# Patient Record
Sex: FEMALE
[De-identification: names, ages, dates, MRNs, and addresses within clinical notes are randomized per-mention and may not be internally consistent; named-entity substitution may affect disease eponyms.]

---

## 2017-08-21 NOTE — C.PDOC
History Of Present Illness


61 y/o female presents to ED with complaints of new onset front of left knee 

pain and swelling for 10 days. Patient states pain is worse with weight bearing 

and movement stating,"I feel crunching". Patient reports limited relief with 

Motrin but did not take any today secondary to limited use for GI upset. 

Patient denies trauma, weakness, numbness or any other complaints at this time. 





























NEW ONSET L KNEE PAIN X 10 DAYS. NO TRAUMA. PAIN FRONT OF KNEE WORSE W WT BEAR, 

MOVEMENT. "I FEEL CRUNCHING" LIMITED RELIEF W MOTRIN. NONE TAKEN TODAY. +SWELL. 

PS LIMITED USE MOTRIN DUE TO GI UPSET





EXAM


MILD DIST NONTOX


EXT L KNEE LMITED DUE TO BODY HABITUS. +GEN TEND ANTERIOR. AROM WO DIFF, 

REPRODUC PAIN. NO DEFORM, SUBLUX.


Time Seen by Provider: 08/21/17 14:48


Chief Complaint (Nursing): Lower Extremity Problem/Injury


History Per: Patient


History/Exam Limitations: no limitations


Onset/Duration Of Symptoms: Days


Current Symptoms Are (Timing): Still Present





Past Medical History


Reviewed: Historical Data, Nursing Documentation, Vital Signs


Vital Signs: 


 Last Vital Signs











Temp  98.8 F   08/21/17 14:36


 


Pulse  76   08/21/17 14:36


 


Resp  16   08/21/17 14:36


 


BP  152/80 H  08/21/17 14:36


 


Pulse Ox  99   08/21/17 15:00














- Medical History


PMH: Gastritis, HTN, Osteoporosis


Family History: States: No Known Family Hx





- Social History


Hx Tobacco Use: No


Hx Alcohol Use: No


Hx Substance Use: No





- Immunization History


Hx Tetanus Toxoid Vaccination: No


Hx Influenza Vaccination: No


Hx Pneumococcal Vaccination: No





Review Of Systems


Except As Marked, All Systems Reviewed And Found Negative.


Musculoskeletal: Positive for: Leg Pain.  Negative for: Foot Pain


Skin: Negative for: Rash


Neurological: Negative for: Weakness, Numbness





Physical Exam





- Physical Exam


Appears: Non-toxic, Other (Mild distress)


Skin: Normal Color, Warm, Dry, No Rash


Eye(s): bilateral: Normal Inspection


Oral Mucosa: Moist


Extremity: Tenderness (General tenderness to anterior left knee), Capillary 

Refill (<2 seconds), No Deformity, Other (Left knee limited due to body 

habitus. Reproducible pain, No sublux)


Extremity: Bilateral: Normal ROM


Neurological/Psych: Oriented x3, Normal Speech, Normal Cognition, Normal Motor, 

Normal Sensation





ED Course And Treatment


O2 Sat by Pulse Oximetry: 99 (RA)


Pulse Ox Interpretation: Normal





Disposition


Counseled Patient/Family Regarding: Diagnosis, Need For Followup, Rx Given





- Disposition


Referrals: 


[facility] [Outside]


[facility] [Outside]


Michael Valverde III, MD [Staff Provider] - 


Disposition: HOME/ ROUTINE


Disposition Time: 14:59


Condition: IMPROVED


Prescriptions: 


Acetaminophen/Codeine [Tylenol/Codeine 300 MG/30 MG] 2 tab PO Q6H #20 tab


Famotidine [Pepcid AC] 10 mg PO QN #30 tablet


Ibuprofen [Motrin] 400 mg PO QID #30 tab


Instructions:  Osteoarthritis (ED)


Forms:  [employer] (English), Work Excuse


Print Language: [place]iboutian





- Clinical Impression


Clinical Impression: 


 Knee pain








- PA / NP / Resident Statement


MD/DO has examined the patient and agrees with the treatment plan.





- Scribe Statement


The provider has reviewed the documentation as recorded by the Tiffany Cabral





All medical record entries made by the Syedaibphilomena were at my direction and 

personally dictated by me. I have reviewed the chart and agree that the record 

accurately reflects my personal performance of the history, physical exam, 

medical decision making, and the department course for this patient. I have 

also personally directed, reviewed, and agree with the discharge instructions 

and disposition.

## 2019-02-08 NOTE — C.PDOC
History Of Present Illness


64-year-old female presents to the ED for evaluation of right posterior knee 

pain for three days. Patient denies fever, chills, calf tenderness, rash, or any

falls/injuries at this time.


Time Seen by Provider: 02/08/19 15:49


Chief Complaint (Nursing): Lower Extremity Problem/Injury


History Per: Patient


History/Exam Limitations: no limitations


Onset/Duration Of Symptoms: Days (3)


Current Symptoms Are (Timing): Still Present


Additional History Per: Patient





- Knee


Description Of Injury: denies: Fell, Struck With Object, Struck Against Object, 

Twisted





Past Medical History


Reviewed: Historical Data, Nursing Documentation, Vital Signs


Vital Signs: 





                                Last Vital Signs











Temp  98.4 F   02/08/19 15:46


 


Pulse  68   02/08/19 15:46


 


Resp  16   02/08/19 15:46


 


BP  143/85   02/08/19 15:46


 


Pulse Ox  96   02/08/19 15:46














- Medical History


PMH: Gastritis, HTN, Osteoporosis


Surgical History: No Surg Hx


Family History: States: Unknown Family Hx





- Social History


Hx Tobacco Use: No


Hx Alcohol Use: No


Hx Substance Use: No





- Immunization History


Hx Tetanus Toxoid Vaccination: No


Hx Influenza Vaccination: No


Hx Pneumococcal Vaccination: No





Review Of Systems


Constitutional: Negative for: Fever, Chills


Musculoskeletal: Positive for: Other (right posterior knee pain, no calf pain )





Physical Exam





- Physical Exam


Appears: Non-toxic, No Acute Distress


Skin: Normal Color, Warm, Dry, No Ecchymosis


Head: Atraumatic, Normacephalic


Eye(s): bilateral: Normal Inspection


Extremity: Normal ROM, Tenderness (mild, to palpation of right lateral knee ), 

No Calf Tenderness, Capillary Refill (less than 2 seconds ), No Deformity, No 

Swelling, No Other (erythema or warmth to right knee )


Pulses: Left Dorsalis Pedis: Normal, Right Dorsalis Pedis: Normal


Neurological/Psych: Oriented x3, Normal Speech, Normal Cognition, Normal Motor, 

Normal Sensation


Gait: Steady





ED Course And Treatment


O2 Sat by Pulse Oximetry: 96 (on RA)


Pulse Ox Interpretation: Normal





- Other Rad


  ** knee XR 


X-Ray: Viewed By Me, Read By Radiologist


Interpretation: Date of service:  02/08/2019.  PROCEDURE:  Right Knee 

Radiographs.  HISTORY:  RIGHT KNEE PAIN.  COMPARISON:  01/08/2016.  FINDINGS:  

BONES:  Bone alignment and mineralization are normal.  There is no acute 

displaced fracture or bone destruction.  JOINTS:  There is mild tricompartmental

degenerative osteoarthrosis with reduced joint spaces, marginal osteophytes and 

tibial spiking, worse in the medial compartment.  JOINT EFFUSION:  None.  OTHER 

FINDINGS:  There is multifocal calcification in the lateral collateral ligament,

which may be post-traumatic in etiology.  IMPRESSION:  No acute fracture or 

dislocation.  Mild tricompartmental degenerative osteoarthrosis, worse in the 

medial compartment.


Progress Note: Right knee x-ray was ordered and reviewed. X-ray shows some 

arthritic changes. Ace wrap was applied to the knee by me.  Naproxen PO given.  

On reassessment, patient is resting comfortably, showing no signs of distress 

and is stable for discharge. Patient is advised to follow up with orthopedic 

care within 1 week for further evaluation.





Disposition


Counseled Patient/Family Regarding: Studies Performed, Diagnosis, Need For 

Followup, Rx Given





- Disposition


Referrals: 


Xander Avelar MD [Staff Provider] - 


Disposition: HOME/ ROUTINE


Disposition Time: 16:50


Condition: STABLE


Additional Instructions: 


FOLLOW UP WITH ORTHOPEDICS WITHIN 1 WEEK





USE PAIN MEDICATION AS NEEDED





RETURN TO EMERGENCY ROOM IF SYMPTOMS BECOME WORSE 








SEGUIR CON ORTOPEDIA EN JOSE LUIS SEMANA





UTILICE MEDICAMENTOS PARA EL DOLOR SEGN LO NECESARIO





VUELVA A LA LADI DE EMERGENCIA SI LOS SNTOMAS SE HACEN PEOR


Prescriptions: 


Naproxen 375 mg PO BID PRN #20 tablet


 PRN Reason: pain


Instructions:  Osteoarthritis (DC), Knee Pain (DC)


Forms:  [employer] (English)


Print Language: Greenlandic





- Clinical Impression


Clinical Impression: 


 Osteoarthritis of right knee, Right knee pain








- Scribe Statement


The provider has reviewed the documentation as recorded by the Scribe (Debbie Amezquita)


Provider Attestation: 








All medical record entries made by the Scribe were at my direction and perso

malcolm dictated by me. I have reviewed the chart and agree that the record 

accurately reflects my personal performance of the history, physical exam, 

medical decision making, and the department course for this patient. I have also

personally directed, reviewed, and agree with the discharge instructions and 

disposition.

## 2019-02-08 NOTE — RAD
Date of service: 



02/08/2019



PROCEDURE:  Right Knee Radiographs.



HISTORY:

RIGHT KNEE PAIN



COMPARISON:

01/08/2016.



FINDINGS:



BONES:

Bone alignment and mineralization are normal.  There is no acute 

displaced fracture or bone destruction.



JOINTS:

There is mild tricompartmental degenerative osteoarthrosis with 

reduced joint spaces, marginal osteophytes and tibial spiking, worse 

in the medial compartment. 



JOINT EFFUSION:

None. 



OTHER FINDINGS:

There is multifocal calcification in the lateral collateral ligament, 

which may be post-traumatic in etiology.



IMPRESSION:

No acute fracture or dislocation.



Mild tricompartmental degenerative osteoarthrosis, worse in the 

medial compartment.

## 2019-03-18 ENCOUNTER — HOSPITAL ENCOUNTER (OUTPATIENT)
Dept: HOSPITAL 31 - C.LAB | Age: 64
End: 2019-03-18
Payer: SELF-PAY

## 2019-03-18 DIAGNOSIS — Z13.9: ICD-10-CM

## 2019-03-18 DIAGNOSIS — I10: Primary | ICD-10-CM
